# Patient Record
Sex: FEMALE | Race: WHITE | NOT HISPANIC OR LATINO | Employment: OTHER | URBAN - NONMETROPOLITAN AREA
[De-identification: names, ages, dates, MRNs, and addresses within clinical notes are randomized per-mention and may not be internally consistent; named-entity substitution may affect disease eponyms.]

---

## 2023-03-02 ENCOUNTER — OFFICE VISIT (OUTPATIENT)
Dept: CARDIOLOGY | Facility: CLINIC | Age: 68
End: 2023-03-02
Payer: MEDICARE

## 2023-03-02 VITALS
HEART RATE: 69 BPM | WEIGHT: 118.2 LBS | SYSTOLIC BLOOD PRESSURE: 110 MMHG | DIASTOLIC BLOOD PRESSURE: 60 MMHG | HEIGHT: 63 IN | BODY MASS INDEX: 20.94 KG/M2

## 2023-03-02 DIAGNOSIS — I48.0 PAROXYSMAL ATRIAL FIBRILLATION: ICD-10-CM

## 2023-03-02 DIAGNOSIS — R00.2 PALPITATIONS: Primary | ICD-10-CM

## 2023-03-02 DIAGNOSIS — E78.00 HYPERCHOLESTEROLEMIA: ICD-10-CM

## 2023-03-02 DIAGNOSIS — R06.02 SHORTNESS OF BREATH: ICD-10-CM

## 2023-03-02 PROCEDURE — 99204 OFFICE O/P NEW MOD 45 MIN: CPT | Performed by: NURSE PRACTITIONER

## 2023-03-02 PROCEDURE — 93000 ELECTROCARDIOGRAM COMPLETE: CPT | Performed by: NURSE PRACTITIONER

## 2023-03-02 RX ORDER — LANOLIN ALCOHOL/MO/W.PET/CERES
1000 CREAM (GRAM) TOPICAL DAILY
COMMUNITY

## 2023-03-02 RX ORDER — VENLAFAXINE HYDROCHLORIDE 150 MG/1
150 CAPSULE, EXTENDED RELEASE ORAL DAILY
COMMUNITY

## 2023-03-02 RX ORDER — METOPROLOL TARTRATE 50 MG/1
50 TABLET, FILM COATED ORAL 2 TIMES DAILY
COMMUNITY

## 2023-03-02 RX ORDER — CHOLECALCIFEROL (VITAMIN D3) 50 MCG
2000 TABLET ORAL DAILY
COMMUNITY

## 2023-03-02 RX ORDER — CETIRIZINE HYDROCHLORIDE 10 MG/1
10 TABLET ORAL DAILY
COMMUNITY

## 2023-03-02 RX ORDER — ASPIRIN 81 MG/1
81 TABLET ORAL DAILY
COMMUNITY

## 2023-03-02 RX ORDER — AMOXICILLIN AND CLAVULANATE POTASSIUM 875; 125 MG/1; MG/1
1 TABLET, FILM COATED ORAL 2 TIMES DAILY
COMMUNITY

## 2023-03-02 RX ORDER — ROSUVASTATIN CALCIUM 20 MG/1
20 TABLET, COATED ORAL DAILY
COMMUNITY

## 2023-03-02 NOTE — PROGRESS NOTES
Subjective     Chief Complaint   Patient presents with   • Establish Care     Referred per PCP for cardiac evaluation. No prior cardiac testing.   • Lab     Last labs per PCP, see chart.   • Rapid Heart Rate     She reports going to follow up visit with Dr Clarke after colonoscopy, was told she had elevated heart rate. She had felt a flutter in the past that she felt was anxiety. She went to [facility] ER 1/24/23 for elevated heart rate per apple watch,180. She reports being told at ER she had Afib. Since starting Lopressor heart rate average in the 60s.   • Dizziness     Notices occasional lightheadedness, not often. She feels could be related to not eating well.     Initial cardiac evaluation;    DANY  Brittany Gorman is a 67-year-old female with hypercholesterolemia, tobacco use and anxiety referred for evaluation of newly diagnosed atrial fibrillation.  She admits to having palpitations off and on for many years she attributed to anxiety and stress.  She recently underwent routine colonoscopy and was told at follow-up that she had periods of elevated heart rate/PAF and to monitor heart rate.  She then began monitoring heart rate and decided to present to [facility] when her Apple Watch logged a rate of 180.  She was diagnosed with AF, started Lopressor 50 mg twice daily and Plavix.  She then saw Dr. Fowler who stopped Plavix and started low-dose aspirin.  She presents here for further recommendations.  Since starting metoprolol, palpitations have essentially resolved.  She admits to mild to moderate dyspnea on exertion.  No anginal chest pain.  She has intermittent lightheadedness but no TIA or strokelike symptoms.  Records from [facility] reviewed.  EKG showed NSR at 91 bpm, CBC, CMP normal.  Troponin negative. Lipids prior Dr. Fowler on 11/28/2022 showed LDL well controlled at 76, HDL 77, TRI 54, . TSH and magnesium not available.  She has smoked for 50 years, trying to cut down.  Mother had an  MI without stenting or bypass.  Father  from lung cancer.    Cardiac History  History reviewed. No pertinent surgical history.    Current Outpatient Medications   Medication Sig Dispense Refill   • amoxicillin-clavulanate (AUGMENTIN) 875-125 MG per tablet Take 1 tablet by mouth 2 (Two) Times a Day.     • aspirin 81 MG EC tablet Take 1 tablet by mouth Daily.     • cetirizine (zyrTEC) 10 MG tablet Take 1 tablet by mouth Daily.     • Cholecalciferol (Vitamin D) 50 MCG (2000 UT) tablet Take 2,000 Units by mouth Daily.     • metoprolol tartrate (LOPRESSOR) 50 MG tablet Take 1 tablet by mouth 2 (Two) Times a Day.     • rosuvastatin (CRESTOR) 20 MG tablet Take 1 tablet by mouth Daily.     • venlafaxine XR (EFFEXOR-XR) 150 MG 24 hr capsule Take 1 capsule by mouth Daily.     • vitamin B-12 (CYANOCOBALAMIN) 1000 MCG tablet Take 1 tablet by mouth Daily.       No current facility-administered medications for this visit.     Other and Sulfa antibiotics    Past Medical History:   Diagnosis Date   • Anxiety disorder    • COPD (chronic obstructive pulmonary disease) ([facility])    • Depression    • Hx of cholecystectomy    • Hyperlipidemia    • Nicotine dependence    • PAF (paroxysmal atrial fibrillation) ([facility])    • S/P trigger finger release     x2   • Vitamin D deficiency      Social History     Socioeconomic History   • Marital status:    Tobacco Use   • Smoking status: Every Day     Packs/day: 0.25     Types: Cigarettes     Start date:    • Smokeless tobacco: Never   Vaping Use   • Vaping Use: Never used   Substance and Sexual Activity   • Alcohol use: Not Currently   • Drug use: Never   • Sexual activity: Defer     Family History   Problem Relation Age of Onset   • Heart attack Mother 82   • Diabetes Mother    • Lung cancer Father    • Cancer Brother    • No Known Problems Brother    • Fibromyalgia Daughter    • Arthritis Daughter    • Allergies Daughter      Review of Systems   Constitutional: Positive for fatigue.  "  HENT: Negative.    Eyes: Negative.    Respiratory: Positive for shortness of breath and wheezing.    Cardiovascular: Positive for palpitations.   Gastrointestinal: Negative.    Endocrine: Negative.    Genitourinary: Negative.    Musculoskeletal: Negative.    Skin: Negative.    Allergic/Immunologic: Negative.    Neurological: Positive for dizziness and light-headedness.   Hematological: Negative.    Psychiatric/Behavioral: Negative.      Diabetes- No  Thyroid- normal    Objective     /60 (BP Location: Right arm)   Pulse 69   Ht 160 cm (63\")   Wt 53.6 kg (118 lb 3.2 oz)   BMI 20.94 kg/m²     Physical Exam  Vitals and nursing note reviewed.   Constitutional:       General: She is not in acute distress.     Appearance: Normal appearance. She is normal weight. She is not ill-appearing.   HENT:      Head: Normocephalic and atraumatic.      Right Ear: External ear normal.      Left Ear: External ear normal.   Eyes:      General: No scleral icterus.     Extraocular Movements: Extraocular movements intact.      Pupils: Pupils are equal, round, and reactive to light.   Neck:      Vascular: No carotid bruit.   Cardiovascular:      Rate and Rhythm: Normal rate and regular rhythm.      Pulses: Normal pulses.      Heart sounds: Murmur heard.   Pulmonary:      Effort: Pulmonary effort is normal.      Breath sounds: Normal breath sounds.   Abdominal:      General: Abdomen is flat. Bowel sounds are normal. There is no distension.      Palpations: Abdomen is soft.   Musculoskeletal:         General: No swelling. Normal range of motion.      Cervical back: Normal range of motion.      Right lower leg: No edema.      Left lower leg: No edema.   Skin:     General: Skin is warm.      Capillary Refill: Capillary refill takes less than 2 seconds.   Neurological:      General: No focal deficit present.      Mental Status: She is alert and oriented to person, place, and time.   Psychiatric:         Mood and Affect: Mood normal.   "       Behavior: Behavior normal.         ECG 12 Lead    Date/Time: 3/2/2023 11:38 AM  Performed by: Julianna Gonzalez APRN  Authorized by: Julianna Gonzalez APRN   Comparison: compared with previous ECG from 1/24/2023  Similar to previous ECG  Comparison to previous ECG: Sinus rhythm  Rhythm: sinus rhythm  Rate: normal  BPM: 69  ST Segments: ST segments normal    Clinical impression: normal ECG  Comments:  ms  QTc 445 ms          Assessment & Plan     Diagnoses and all orders for this visit:    1. Palpitations (Primary)  -     Stress Test With Myocardial Perfusion One Day; Future  -     Adult Transthoracic Echo Complete W/ Cont if Necessary Per Protocol; Future  -     Holter Monitor - 72 Hour Up To 15 Days; Future  -     Magnesium; Future  -     TSH; Future    2. Paroxysmal atrial fibrillation (HCC)  -     Stress Test With Myocardial Perfusion One Day; Future  -     Adult Transthoracic Echo Complete W/ Cont if Necessary Per Protocol; Future  -     Holter Monitor - 72 Hour Up To 15 Days; Future  -     Magnesium; Future  -     TSH; Future    3. Shortness of breath  -     Stress Test With Myocardial Perfusion One Day; Future  -     Adult Transthoracic Echo Complete W/ Cont if Necessary Per Protocol; Future  -     Holter Monitor - 72 Hour Up To 15 Days; Future  -     Magnesium; Future  -     TSH; Future    4. Hypercholesterolemia  -     Stress Test With Myocardial Perfusion One Day; Future  -     Adult Transthoracic Echo Complete W/ Cont if Necessary Per Protocol; Future    Other orders  -     ECG 12 Lead       Clinical exam reveals normal S1, S2 with soft, holosystolic 1/6 murmur at the mitral area.  EKG shows NSR with normal AK and QT intervals.  Heart rate 69 bpm.  Normal peripheral pulses, no edema.  No wheezing appreciated.    Paroxysmal atrial fibrillation has been suggested by GI provider as well as [facility].  No EKG strips available to document the arrhythmia.  EKG today as well as at [facility] showed normal  sinus rhythm.  Will place Holter monitor x7 days to evaluate.  I did not start NOAC at this time.  Will try to obtain documentation of AF prior to initiating.  If AF is noted, [id number] score is 2 indicating 2.2% annual thromboembolic stroke risk.  NOAC will be recommended.  We will check TSH and mag when she presents for testing.    To evaluate cardiac status and possible etiology of the arrhythmia and dyspnea, nuclear stress test and echocardiogram ordered.  She would like to walk on the treadmill.  Will evaluate heart rate and blood pressure response, exercise tolerance, EKG to rule out stress-induced arrhythmia and nuclear imaging to rule out ischemia.  Echocardiogram to evaluate LVEF, mitral and aortic valves, LA size and PA pressure.    Hyperlipidemia appears to be well managed with rosuvastatin, LDL/HDL at goal.  BMI is normal.  No evidence of diabetes.    Tobacco use-ongoing, she is trying to reduce the number of cigarettes and would like to quit.  She has taken Chantix in the past.  She may be interested in trying this again in the future.  She is going to think about it.  She denies having CT chest but states Dr. Fowler has informed her she has COPD.    Further recommendations to follow stress, echo, Holter and labs.  If she continues to have PAF despite beta-blocker, will change to sotalol.  NOAC will be added at that time.  Follow-up in 6 months or sooner if needed.    NEGAR De La Cruz

## 2023-03-20 ENCOUNTER — HOSPITAL ENCOUNTER (OUTPATIENT)
Dept: CARDIOLOGY | Facility: HOSPITAL | Age: 68
Discharge: HOME | End: 2023-03-20
Payer: MEDICARE

## 2023-03-20 ENCOUNTER — LAB (OUTPATIENT)
Dept: LAB | Facility: HOSPITAL | Age: 68
End: 2023-03-20
Payer: MEDICARE

## 2023-03-20 VITALS — HEIGHT: 63 IN | BODY MASS INDEX: 20.94 KG/M2 | WEIGHT: 118.17 LBS

## 2023-03-20 DIAGNOSIS — R06.02 SHORTNESS OF BREATH: ICD-10-CM

## 2023-03-20 DIAGNOSIS — I48.0 PAROXYSMAL ATRIAL FIBRILLATION: ICD-10-CM

## 2023-03-20 DIAGNOSIS — R00.2 PALPITATIONS: ICD-10-CM

## 2023-03-20 DIAGNOSIS — E78.00 HYPERCHOLESTEROLEMIA: ICD-10-CM

## 2023-03-20 LAB
AORTIC DIMENSIONLESS INDEX: 0.83 (DI)
BH CV ECHO MEAS - ACS: 1.76 CM
BH CV ECHO MEAS - AO MAX PG: 5 MMHG
BH CV ECHO MEAS - AO MEAN PG: 2.7 MMHG
BH CV ECHO MEAS - AO ROOT DIAM: 2.6 CM
BH CV ECHO MEAS - AO V2 MAX: 112.1 CM/SEC
BH CV ECHO MEAS - AO V2 VTI: 28.6 CM
BH CV ECHO MEAS - EDV(CUBED): 81.5 ML
BH CV ECHO MEAS - EF_3D-VOL: 65 %
BH CV ECHO MEAS - ESV(CUBED): 30.5 ML
BH CV ECHO MEAS - FS: 27.9 %
BH CV ECHO MEAS - IVS/LVPW: 1.01 CM
BH CV ECHO MEAS - IVSD: 0.9 CM
BH CV ECHO MEAS - LA DIMENSION: 3.4 CM
BH CV ECHO MEAS - LAT PEAK E' VEL: 11 CM/SEC
BH CV ECHO MEAS - LV MASS(C)D: 122.9 GRAMS
BH CV ECHO MEAS - LV MAX PG: 3.4 MMHG
BH CV ECHO MEAS - LV MEAN PG: 1.42 MMHG
BH CV ECHO MEAS - LV V1 MAX: 92.7 CM/SEC
BH CV ECHO MEAS - LV V1 VTI: 24 CM
BH CV ECHO MEAS - LVIDD: 4.3 CM
BH CV ECHO MEAS - LVIDS: 3.1 CM
BH CV ECHO MEAS - LVPWD: 0.88 CM
BH CV ECHO MEAS - MED PEAK E' VEL: 7 CM/SEC
BH CV ECHO MEAS - MR MAX PG: 65.2 MMHG
BH CV ECHO MEAS - MR MAX VEL: 403.8 CM/SEC
BH CV ECHO MEAS - MV A MAX VEL: 66.2 CM/SEC
BH CV ECHO MEAS - MV DEC SLOPE: 350.8 CM/SEC2
BH CV ECHO MEAS - MV DEC TIME: 0.25 MSEC
BH CV ECHO MEAS - MV E MAX VEL: 72.9 CM/SEC
BH CV ECHO MEAS - MV E/A: 1.1
BH CV ECHO MEAS - MV MAX PG: 2.8 MMHG
BH CV ECHO MEAS - MV MEAN PG: 1.39 MMHG
BH CV ECHO MEAS - MV P1/2T: 76.6 MSEC
BH CV ECHO MEAS - MV V2 VTI: 33.5 CM
BH CV ECHO MEAS - MVA(P1/2T): 2.9 CM2
BH CV ECHO MEAS - PA V2 MAX: 84 CM/SEC
BH CV ECHO MEAS - RAP SYSTOLE: 8 MMHG
BH CV ECHO MEAS - RV MAX PG: 1.21 MMHG
BH CV ECHO MEAS - RV V1 MAX: 54.9 CM/SEC
BH CV ECHO MEAS - RV V1 VTI: 12.6 CM
BH CV ECHO MEAS - RVDD: 2.8 CM
BH CV ECHO MEAS - RVSP: 12.7 MMHG
BH CV ECHO MEAS - TAPSE (>1.6): 2.5 CM
BH CV ECHO MEAS - TR MAX PG: 4.7 MMHG
BH CV ECHO MEAS - TR MAX VEL: 108.2 CM/SEC
BH CV ECHO MEASUREMENTS AVERAGE E/E' RATIO: 8.1
BH CV REST NUCLEAR ISOTOPE DOSE: 10 MCI
BH CV STRESS COMMENTS STAGE 1: NORMAL
BH CV STRESS DOSE REGADENOSON STAGE 1: 0.4
BH CV STRESS DURATION MIN STAGE 1: 0
BH CV STRESS DURATION SEC STAGE 1: 10
BH CV STRESS NUCLEAR ISOTOPE DOSE: 30 MCI
BH CV STRESS PROTOCOL 1: NORMAL
BH CV STRESS RECOVERY BP: NORMAL MMHG
BH CV STRESS RECOVERY HR: 76 BPM
BH CV STRESS STAGE 1: 1
BH CV XLRA - TDI S': 13.1 CM/SEC
LV EF NUC BP: 59 %
MAGNESIUM SERPL-MCNC: 2.1 MG/DL (ref 1.6–2.4)
MAXIMAL PREDICTED HEART RATE: 155 BPM
MAXIMAL PREDICTED HEART RATE: 155 BPM
PERCENT MAX PREDICTED HR: 58.06 %
SINUS: 2.7 CM
STRESS BASELINE BP: NORMAL MMHG
STRESS BASELINE HR: 54 BPM
STRESS PERCENT HR: 68 %
STRESS POST PEAK BP: NORMAL MMHG
STRESS POST PEAK HR: 90 BPM
STRESS TARGET HR: 132 BPM
STRESS TARGET HR: 132 BPM
TSH SERPL DL<=0.05 MIU/L-ACNC: 0.94 UIU/ML (ref 0.27–4.2)

## 2023-03-20 PROCEDURE — 93306 TTE W/DOPPLER COMPLETE: CPT

## 2023-03-20 PROCEDURE — 78452 HT MUSCLE IMAGE SPECT MULT: CPT | Performed by: INTERNAL MEDICINE

## 2023-03-20 PROCEDURE — 93018 CV STRESS TEST I&R ONLY: CPT | Performed by: INTERNAL MEDICINE

## 2023-03-20 PROCEDURE — 83735 ASSAY OF MAGNESIUM: CPT | Performed by: NURSE PRACTITIONER

## 2023-03-20 PROCEDURE — 93017 CV STRESS TEST TRACING ONLY: CPT

## 2023-03-20 PROCEDURE — 25010000002 REGADENOSON 0.4 MG/5ML SOLUTION

## 2023-03-20 PROCEDURE — A9500 TC99M SESTAMIBI: HCPCS | Performed by: INTERNAL MEDICINE

## 2023-03-20 PROCEDURE — 0 TECHNETIUM SESTAMIBI: Performed by: INTERNAL MEDICINE

## 2023-03-20 PROCEDURE — 84443 ASSAY THYROID STIM HORMONE: CPT | Performed by: NURSE PRACTITIONER

## 2023-03-20 PROCEDURE — 78452 HT MUSCLE IMAGE SPECT MULT: CPT

## 2023-03-20 PROCEDURE — 93306 TTE W/DOPPLER COMPLETE: CPT | Performed by: INTERNAL MEDICINE

## 2023-03-20 RX ADMIN — TECHNETIUM TC 99M SESTAMIBI 1 DOSE: 1 INJECTION INTRAVENOUS at 08:48

## 2023-09-19 ENCOUNTER — OFFICE VISIT (OUTPATIENT)
Dept: CARDIOLOGY | Facility: CLINIC | Age: 68
End: 2023-09-19
Payer: MEDICARE

## 2023-09-19 VITALS
SYSTOLIC BLOOD PRESSURE: 114 MMHG | BODY MASS INDEX: 21.33 KG/M2 | HEART RATE: 58 BPM | HEIGHT: 63 IN | DIASTOLIC BLOOD PRESSURE: 80 MMHG | WEIGHT: 120.4 LBS

## 2023-09-19 DIAGNOSIS — E78.00 HYPERCHOLESTEROLEMIA: Primary | ICD-10-CM

## 2023-09-19 DIAGNOSIS — R00.2 PALPITATIONS: ICD-10-CM

## 2023-09-19 DIAGNOSIS — I48.0 PAF (PAROXYSMAL ATRIAL FIBRILLATION): ICD-10-CM

## 2023-09-19 DIAGNOSIS — Z72.0 TOBACCO USE: ICD-10-CM

## 2023-09-19 PROCEDURE — 93000 ELECTROCARDIOGRAM COMPLETE: CPT | Performed by: NURSE PRACTITIONER

## 2023-09-19 PROCEDURE — 1160F RVW MEDS BY RX/DR IN RCRD: CPT | Performed by: NURSE PRACTITIONER

## 2023-09-19 PROCEDURE — 1159F MED LIST DOCD IN RCRD: CPT | Performed by: NURSE PRACTITIONER

## 2023-09-19 PROCEDURE — 99214 OFFICE O/P EST MOD 30 MIN: CPT | Performed by: NURSE PRACTITIONER

## 2023-09-19 NOTE — LETTER
September 19, 2023       No Recipients    Patient: Brittany Gorman   YOB: 1955   Date of Visit: 9/19/2023       Dear Fabian Fowler MD    Brittany Gorman was in my office today. Below is a copy of my note.    If you have questions, please do not hesitate to call me. I look forward to following Brittany along with you.         Sincerely,        NEGAR Hall        CC:   No Recipients    Chief Complaint   Patient presents with   • Follow-up     Cardiac management   • Lab     Last labs in August per PCP. She had CT of lungs, noticed small nodule on lung. To have repeat CT in October.   • Med Refill     PCP writes refills.   • Medication     She reports PCP is changing Metoprolol tartrate once current bottle is completed, to a medication that is time released.     Subjective      Brittany Gorman is a 67 y.o. female with hypercholesterolemia, tobacco use and anxiety referred March 2023 for questionable PAF. She underwent routine colonoscopy and was told at follow-up that she had periods of elevated heart rate/PAF and advised to monitor heart rate.  She then began monitoring heart rate.  Apple Watch logged a heart rate of 180 and she presented to [facility].  She was diagnosed with AF, started Lopressor 50 mg twice daily and Plavix.  EKG from [facility] reviewed showing sinus rhythm at 91 bpm.  She then saw Dr. Fowler who stopped Plavix and started low-dose aspirin.  She presented to our office on 3/2/2023 with EKG showing sinus rhythm.  No documentation of AF obtained.  Holter monitor x7 days showed no atrial fibrillation, max heart rate 103.  Lexiscan stress and echo cardiogram showed normal LVEF, normal LA size, no ischemia, no arrhythmia.  TSH and mag normal.  Metoprolol and aspirin continued with plan to monitor heart rate and rhythm closely.  [id number] 2 indicating 2.2% annual stroke risk if AF documented.    She returns today for follow-up.  She denies chest pain, shortness of breath,  palpitations.  Heart rate remains well controlled without tachycardia episodes.  She denies TIA, dizziness or syncope.  Low-dose CT for smoking history revealed small nodule with plan to repeat CT in October.  She is planning to change Lopressor to Toprol XL due to forgetting evening dose at times.       Cardiac History:    Past Surgical History:   Procedure Laterality Date   • CARDIOVASCULAR STRESS TEST  03/20/2023    Lexiscan- EF 59%. Negative   • CONVERTED (HISTORICAL) HOLTER  03/21/2023    > 7 Days. Avg 67.    • ECHO - CONVERTED  03/20/2023    TLS. EF 60%. Mild MR. RVSP- 13 mmHg     Current Outpatient Medications   Medication Sig Dispense Refill   • aspirin 81 MG EC tablet Take 1 tablet by mouth Daily.     • cetirizine (zyrTEC) 10 MG tablet Take 1 tablet by mouth Daily.     • Cholecalciferol (Vitamin D) 50 MCG (2000 UT) tablet Take 1 tablet by mouth Daily.     • metoprolol tartrate (LOPRESSOR) 50 MG tablet Take 1 tablet by mouth 2 (Two) Times a Day.     • rosuvastatin (CRESTOR) 20 MG tablet Take 1 tablet by mouth Daily.     • venlafaxine XR (EFFEXOR-XR) 150 MG 24 hr capsule Take 1 capsule by mouth Daily.     • vitamin B-12 (CYANOCOBALAMIN) 1000 MCG tablet Take 1 tablet by mouth Daily.       No current facility-administered medications for this visit.     Other and Sulfa antibiotics    Past Medical History:   Diagnosis Date   • Anxiety disorder    • COPD (chronic obstructive pulmonary disease)    • Depression    • Hx of cholecystectomy    • Hyperlipidemia    • Nicotine dependence    • PAF (paroxysmal atrial fibrillation)    • S/P trigger finger release     x2   • Vitamin D deficiency      Social History     Socioeconomic History   • Marital status:    Tobacco Use   • Smoking status: Every Day     Packs/day: 1.00     Types: Cigarettes     Start date: 1972   • Smokeless tobacco: Never   Vaping Use   • Vaping Use: Never used   Substance and Sexual Activity   • Alcohol use: Yes     Comment: occasional  "beer   • Drug use: Never   • Sexual activity: Defer     Family History   Problem Relation Age of Onset   • Heart attack Mother 82   • Diabetes Mother    • Lung cancer Father    • Cancer Brother    • No Known Problems Brother    • Fibromyalgia Daughter    • Arthritis Daughter    • Allergies Daughter      Review of Systems   Constitutional: Negative for decreased appetite and malaise/fatigue.   HENT: Negative.     Eyes:  Negative for blurred vision.   Cardiovascular:  Negative for chest pain, dyspnea on exertion, leg swelling, palpitations and syncope.   Respiratory:  Positive for shortness of breath (Mild). Negative for sleep disturbances due to breathing.    Endocrine: Negative.    Hematologic/Lymphatic: Negative for bleeding problem. Does not bruise/bleed easily.   Skin: Negative.    Musculoskeletal:  Negative for falls and myalgias.   Gastrointestinal:  Negative for abdominal pain, heartburn and melena.   Genitourinary:  Negative for hematuria.   Neurological:  Negative for dizziness and light-headedness.   Psychiatric/Behavioral:  Negative for altered mental status.    Allergic/Immunologic: Negative.      Objective    /80 (BP Location: Left arm)   Pulse 58   Ht 160 cm (62.99\")   Wt 54.6 kg (120 lb 6.4 oz)   BMI 21.33 kg/m²     Vitals and nursing note reviewed.   Constitutional:       General: Not in acute distress.     Appearance: Well-developed. Not diaphoretic.   Eyes:      Pupils: Pupils are equal, round, and reactive to light.   HENT:      Head: Normocephalic.   Pulmonary:      Effort: Pulmonary effort is normal. No respiratory distress.      Breath sounds: Normal breath sounds.   Cardiovascular:      Bradycardia present. Regular rhythm.   Pulses:     Intact distal pulses.   Edema:     Peripheral edema absent.   Abdominal:      General: Bowel sounds are normal.      Palpations: Abdomen is soft.   Musculoskeletal: Normal range of motion.      Cervical back: Normal range of motion. Skin:     General: " Skin is warm and dry.   Neurological:      Mental Status: Alert and oriented to person, place, and time.        ECG 12 Lead    Date/Time: 9/19/2023 9:41 AM  Performed by: Julianna Gonzalez APRN  Authorized by: Julianna Gonzalez APRN   Comparison: compared with previous ECG from 3/2/2023  Similar to previous ECG  Rhythm: sinus bradycardia  Rate: normal  BPM: 58  ST Segments: ST segments normal    Clinical impression: normal ECG  Comments:  ms  QRS 85 ms  QTc 397 ms            Problem List Items Addressed This Visit          Cardiac and Vasculature    Hypercholesterolemia - Primary    Palpitations    PAF (paroxysmal atrial fibrillation)       Tobacco    Tobacco use      Questionable PAF  -EKG today showed sinus bradycardia at 58 bpm, normal MS and QT interval  -Continue metoprolol, planning to change to Toprol XL  -Continue aspirin at this time  -We discussed PAF in great detail, advised to monitor heart rate and report if tachycardic or palpitations are noted.  May need to repeat EKG/extended monitor.  -With no documentation of atrial fibrillation, recommend aspirin alone for anticoagulant.  [id number] 2, if AF documented, will add Xarelto or Eliquis.    Hypercholesterolemia  -LDL 76, HDL 77 on rosuvastatin 20 mg  -Continue same plan    Tobacco use  -She would like to quit and will continue reducing the number of cigarettes    Cardiac testing reviewed with her.  No ischemia, normal LVEF, normal LA size.  Cardiac status appears stable.  Follow-up in 6 months or sooner if needed.    BMI is within normal parameters. No other follow-up for BMI required.     Brittany Gorman  reports that she has been smoking cigarettes. She started smoking about 51 years ago. She has been smoking an average of 1 pack per day. She has never used smokeless tobacco.. I have educated her on the risk of diseases from using tobacco products such as cancer, COPD, and heart disease. I advised her to quit and she is willing to quit. We have  discussed the following method/s for tobacco cessation:  Counseling.  She is going to continue reducing the number of cigarettes.              Electronically signed by NEGAR Hall,  September 19, 2023 09:45 EDT

## 2023-09-19 NOTE — PROGRESS NOTES
Chief Complaint   Patient presents with    Follow-up     Cardiac management    Lab     Last labs in August per PCP. She had CT of lungs, noticed small nodule on lung. To have repeat CT in October.    Med Refill     PCP writes refills.    Medication     She reports PCP is changing Metoprolol tartrate once current bottle is completed, to a medication that is time released.     Subjective       Brittany Gorman is a 67 y.o. female with hypercholesterolemia, tobacco use and anxiety referred March 2023 for questionable PAF. She underwent routine colonoscopy and was told at follow-up that she had periods of elevated heart rate/PAF and advised to monitor heart rate.  She then began monitoring heart rate.  Apple Watch logged a heart rate of 180 and she presented to [facility].  She was diagnosed with AF, started Lopressor 50 mg twice daily and Plavix.  EKG from [facility] reviewed showing sinus rhythm at 91 bpm.  She then saw Dr. Fowler who stopped Plavix and started low-dose aspirin.  She presented to our office on 3/2/2023 with EKG showing sinus rhythm.  No documentation of AF obtained.  Holter monitor x7 days showed no atrial fibrillation, max heart rate 103.  Lexiscan stress and echo cardiogram showed normal LVEF, normal LA size, no ischemia, no arrhythmia.  TSH and mag normal.  Metoprolol and aspirin continued with plan to monitor heart rate and rhythm closely.  [id number] 2 indicating 2.2% annual stroke risk if AF documented.    She returns today for follow-up.  She denies chest pain, shortness of breath, palpitations.  Heart rate remains well controlled without tachycardia episodes.  She denies TIA, dizziness or syncope.  Low-dose CT for smoking history revealed small nodule with plan to repeat CT in October.  She is planning to change Lopressor to Toprol XL due to forgetting evening dose at times.       Cardiac History:    Past Surgical History:   Procedure Laterality Date    CARDIOVASCULAR STRESS TEST   03/20/2023    Lexiscan- EF 59%. Negative    CONVERTED (HISTORICAL) HOLTER  03/21/2023    > 7 Days. Avg 67.     ECHO - CONVERTED  03/20/2023    TLS. EF 60%. Mild MR. RVSP- 13 mmHg     Current Outpatient Medications   Medication Sig Dispense Refill    aspirin 81 MG EC tablet Take 1 tablet by mouth Daily.      cetirizine (zyrTEC) 10 MG tablet Take 1 tablet by mouth Daily.      Cholecalciferol (Vitamin D) 50 MCG (2000 UT) tablet Take 1 tablet by mouth Daily.      metoprolol tartrate (LOPRESSOR) 50 MG tablet Take 1 tablet by mouth 2 (Two) Times a Day.      rosuvastatin (CRESTOR) 20 MG tablet Take 1 tablet by mouth Daily.      venlafaxine XR (EFFEXOR-XR) 150 MG 24 hr capsule Take 1 capsule by mouth Daily.      vitamin B-12 (CYANOCOBALAMIN) 1000 MCG tablet Take 1 tablet by mouth Daily.       No current facility-administered medications for this visit.     Other and Sulfa antibiotics    Past Medical History:   Diagnosis Date    Anxiety disorder     COPD (chronic obstructive pulmonary disease)     Depression     Hx of cholecystectomy     Hyperlipidemia     Nicotine dependence     PAF (paroxysmal atrial fibrillation)     S/P trigger finger release     x2    Vitamin D deficiency      Social History     Socioeconomic History    Marital status:    Tobacco Use    Smoking status: Every Day     Packs/day: 1.00     Types: Cigarettes     Start date: 1972    Smokeless tobacco: Never   Vaping Use    Vaping Use: Never used   Substance and Sexual Activity    Alcohol use: Yes     Comment: occasional beer    Drug use: Never    Sexual activity: Defer     Family History   Problem Relation Age of Onset    Heart attack Mother 82    Diabetes Mother     Lung cancer Father     Cancer Brother     No Known Problems Brother     Fibromyalgia Daughter     Arthritis Daughter     Allergies Daughter      Review of Systems   Constitutional: Negative for decreased appetite and malaise/fatigue.   HENT: Negative.     Eyes:  Negative for blurred  "vision.   Cardiovascular:  Negative for chest pain, dyspnea on exertion, leg swelling, palpitations and syncope.   Respiratory:  Positive for shortness of breath (Mild). Negative for sleep disturbances due to breathing.    Endocrine: Negative.    Hematologic/Lymphatic: Negative for bleeding problem. Does not bruise/bleed easily.   Skin: Negative.    Musculoskeletal:  Negative for falls and myalgias.   Gastrointestinal:  Negative for abdominal pain, heartburn and melena.   Genitourinary:  Negative for hematuria.   Neurological:  Negative for dizziness and light-headedness.   Psychiatric/Behavioral:  Negative for altered mental status.    Allergic/Immunologic: Negative.      Objective     /80 (BP Location: Left arm)   Pulse 58   Ht 160 cm (62.99\")   Wt 54.6 kg (120 lb 6.4 oz)   BMI 21.33 kg/m²     Vitals and nursing note reviewed.   Constitutional:       General: Not in acute distress.     Appearance: Well-developed. Not diaphoretic.   Eyes:      Pupils: Pupils are equal, round, and reactive to light.   HENT:      Head: Normocephalic.   Pulmonary:      Effort: Pulmonary effort is normal. No respiratory distress.      Breath sounds: Normal breath sounds.   Cardiovascular:      Bradycardia present. Regular rhythm.   Pulses:     Intact distal pulses.   Edema:     Peripheral edema absent.   Abdominal:      General: Bowel sounds are normal.      Palpations: Abdomen is soft.   Musculoskeletal: Normal range of motion.      Cervical back: Normal range of motion. Skin:     General: Skin is warm and dry.   Neurological:      Mental Status: Alert and oriented to person, place, and time.        ECG 12 Lead    Date/Time: 9/19/2023 9:41 AM  Performed by: Juilanna Gonzalez APRN  Authorized by: Julianna Gonzalez APRN   Comparison: compared with previous ECG from 3/2/2023  Similar to previous ECG  Rhythm: sinus bradycardia  Rate: normal  BPM: 58  ST Segments: ST segments normal    Clinical impression: normal ECG  Comments:  " ms  QRS 85 ms  QTc 397 ms            Problem List Items Addressed This Visit          Cardiac and Vasculature    Hypercholesterolemia - Primary    Palpitations    PAF (paroxysmal atrial fibrillation)       Tobacco    Tobacco use      Questionable PAF  -EKG today showed sinus bradycardia at 58 bpm, normal UT and QT interval  -Continue metoprolol, planning to change to Toprol XL  -Continue aspirin at this time  -We discussed PAF in great detail, advised to monitor heart rate and report if tachycardic or palpitations are noted.  May need to repeat EKG/extended monitor.  -With no documentation of atrial fibrillation, recommend aspirin alone for anticoagulant.  [id number] 2, if AF documented, will add Xarelto or Eliquis.    Hypercholesterolemia  -LDL 76, HDL 77 on rosuvastatin 20 mg  -Continue same plan    Tobacco use  -She would like to quit and will continue reducing the number of cigarettes    Cardiac testing reviewed with her.  No ischemia, normal LVEF, normal LA size.  Cardiac status appears stable.  Follow-up in 6 months or sooner if needed.    BMI is within normal parameters. No other follow-up for BMI required.     Brittany Gorman  reports that she has been smoking cigarettes. She started smoking about 51 years ago. She has been smoking an average of 1 pack per day. She has never used smokeless tobacco.. I have educated her on the risk of diseases from using tobacco products such as cancer, COPD, and heart disease. I advised her to quit and she is willing to quit. We have discussed the following method/s for tobacco cessation:  Counseling.  She is going to continue reducing the number of cigarettes.              Electronically signed by NEGAR Hall,  September 19, 2023 09:45 EDT